# Patient Record
Sex: FEMALE | Race: WHITE | NOT HISPANIC OR LATINO | URBAN - METROPOLITAN AREA
[De-identification: names, ages, dates, MRNs, and addresses within clinical notes are randomized per-mention and may not be internally consistent; named-entity substitution may affect disease eponyms.]

---

## 2018-03-08 ENCOUNTER — OUTPATIENT (OUTPATIENT)
Dept: OUTPATIENT SERVICES | Facility: HOSPITAL | Age: 33
LOS: 1 days | End: 2018-03-08

## 2018-03-08 VITALS
DIASTOLIC BLOOD PRESSURE: 80 MMHG | HEART RATE: 70 BPM | TEMPERATURE: 98 F | WEIGHT: 130.95 LBS | HEIGHT: 64 IN | RESPIRATION RATE: 14 BRPM | SYSTOLIC BLOOD PRESSURE: 124 MMHG

## 2018-03-08 DIAGNOSIS — O02.1 MISSED ABORTION: ICD-10-CM

## 2018-03-08 DIAGNOSIS — Z90.89 ACQUIRED ABSENCE OF OTHER ORGANS: Chronic | ICD-10-CM

## 2018-03-08 DIAGNOSIS — Z98.890 OTHER SPECIFIED POSTPROCEDURAL STATES: Chronic | ICD-10-CM

## 2018-03-08 LAB
BLD GP AB SCN SERPL QL: NEGATIVE — SIGNIFICANT CHANGE UP
HCT VFR BLD CALC: 38.1 % — SIGNIFICANT CHANGE UP (ref 34.5–45)
HGB BLD-MCNC: 13.2 G/DL — SIGNIFICANT CHANGE UP (ref 11.5–15.5)
MCHC RBC-ENTMCNC: 32 PG — SIGNIFICANT CHANGE UP (ref 27–34)
MCHC RBC-ENTMCNC: 34.6 % — SIGNIFICANT CHANGE UP (ref 32–36)
MCV RBC AUTO: 92.3 FL — SIGNIFICANT CHANGE UP (ref 80–100)
NRBC # FLD: 0 — SIGNIFICANT CHANGE UP
PLATELET # BLD AUTO: 190 K/UL — SIGNIFICANT CHANGE UP (ref 150–400)
PMV BLD: 9.7 FL — SIGNIFICANT CHANGE UP (ref 7–13)
RBC # BLD: 4.13 M/UL — SIGNIFICANT CHANGE UP (ref 3.8–5.2)
RBC # FLD: 11.2 % — SIGNIFICANT CHANGE UP (ref 10.3–14.5)
RH IG SCN BLD-IMP: POSITIVE — SIGNIFICANT CHANGE UP
WBC # BLD: 5.52 K/UL — SIGNIFICANT CHANGE UP (ref 3.8–10.5)
WBC # FLD AUTO: 5.52 K/UL — SIGNIFICANT CHANGE UP (ref 3.8–10.5)

## 2018-03-08 NOTE — H&P PST ADULT - NSANTHOSAYNRD_GEN_A_CORE
No. CHRISTI screening performed.  STOP BANG Legend: 0-2 = LOW Risk; 3-4 = INTERMEDIATE Risk; 5-8 = HIGH Risk

## 2018-03-08 NOTE — H&P PST ADULT - RS GEN PE MLT RESP DETAILS PC
no wheezes/no rales/good air movement/clear to auscultation bilaterally/breath sounds equal/no rhonchi/respirations non-labored

## 2018-03-08 NOTE — H&P PST ADULT - HISTORY OF PRESENT ILLNESS
This is a 32 y.o. female LMP 17  , who presented to Dr Ramires at 6.5 weeks gestation , bloods done at that timeto confirm pregnancy . Pt complaint of vaginal spotting , evaluated 3/7/18 , had sono , no heartbeat. Pt has missed  , now for surgery .

## 2018-03-08 NOTE — H&P PST ADULT - LYMPHATIC
anterior cervical L/posterior cervical L/supraclavicular R/anterior cervical R/posterior cervical R/supraclavicular L

## 2018-03-08 NOTE — H&P PST ADULT - FAMILY HISTORY
Father  Still living? Yes, Estimated age: 61-70  Family history of hypertension, Age at diagnosis: Age Unknown     Mother  Still living? Yes, Estimated age: 61-70  Family history of diabetes mellitus, Age at diagnosis: Age Unknown

## 2018-03-08 NOTE — H&P PST ADULT - MUSCULOSKELETAL
detailed exam no joint swelling/normal strength/no joint erythema/no calf tenderness/no joint warmth/ROM intact details…

## 2018-03-13 ENCOUNTER — TRANSCRIPTION ENCOUNTER (OUTPATIENT)
Age: 33
End: 2018-03-13

## 2018-04-06 ENCOUNTER — TRANSCRIPTION ENCOUNTER (OUTPATIENT)
Age: 33
End: 2018-04-06

## 2018-07-02 ENCOUNTER — TRANSCRIPTION ENCOUNTER (OUTPATIENT)
Age: 33
End: 2018-07-02

## 2018-11-03 ENCOUNTER — TRANSCRIPTION ENCOUNTER (OUTPATIENT)
Age: 33
End: 2018-11-03

## 2019-01-06 ENCOUNTER — TRANSCRIPTION ENCOUNTER (OUTPATIENT)
Age: 34
End: 2019-01-06

## 2019-02-14 ENCOUNTER — INPATIENT (INPATIENT)
Facility: HOSPITAL | Age: 34
LOS: 1 days | Discharge: HOME | End: 2019-02-16
Attending: SPECIALIST | Admitting: SPECIALIST

## 2019-02-14 ENCOUNTER — TRANSCRIPTION ENCOUNTER (OUTPATIENT)
Age: 34
End: 2019-02-14

## 2019-02-14 VITALS — WEIGHT: 158.73 LBS | HEIGHT: 65 IN

## 2019-02-14 DIAGNOSIS — Z3A.00 WEEKS OF GESTATION OF PREGNANCY NOT SPECIFIED: ICD-10-CM

## 2019-02-14 DIAGNOSIS — Z90.89 ACQUIRED ABSENCE OF OTHER ORGANS: Chronic | ICD-10-CM

## 2019-02-14 DIAGNOSIS — O26.899 OTHER SPECIFIED PREGNANCY RELATED CONDITIONS, UNSPECIFIED TRIMESTER: ICD-10-CM

## 2019-02-14 DIAGNOSIS — Z98.890 OTHER SPECIFIED POSTPROCEDURAL STATES: Chronic | ICD-10-CM

## 2019-02-14 LAB
BASOPHILS # BLD AUTO: 0.02 K/UL — SIGNIFICANT CHANGE UP (ref 0–0.2)
BASOPHILS NFR BLD AUTO: 0.2 % — SIGNIFICANT CHANGE UP (ref 0–2)
BLD GP AB SCN SERPL QL: NEGATIVE — SIGNIFICANT CHANGE UP
EOSINOPHIL # BLD AUTO: 0.02 K/UL — SIGNIFICANT CHANGE UP (ref 0–0.5)
EOSINOPHIL NFR BLD AUTO: 0.2 % — SIGNIFICANT CHANGE UP (ref 0–6)
HCT VFR BLD CALC: 39 % — SIGNIFICANT CHANGE UP (ref 34.5–45)
HGB BLD-MCNC: 12.7 G/DL — SIGNIFICANT CHANGE UP (ref 11.5–15.5)
IMM GRANULOCYTES NFR BLD AUTO: 0.4 % — SIGNIFICANT CHANGE UP (ref 0–1.5)
LYMPHOCYTES # BLD AUTO: 1.38 K/UL — SIGNIFICANT CHANGE UP (ref 1–3.3)
LYMPHOCYTES # BLD AUTO: 14.8 % — SIGNIFICANT CHANGE UP (ref 13–44)
MCHC RBC-ENTMCNC: 30.7 PG — SIGNIFICANT CHANGE UP (ref 27–34)
MCHC RBC-ENTMCNC: 32.6 % — SIGNIFICANT CHANGE UP (ref 32–36)
MCV RBC AUTO: 94.2 FL — SIGNIFICANT CHANGE UP (ref 80–100)
MONOCYTES # BLD AUTO: 0.62 K/UL — SIGNIFICANT CHANGE UP (ref 0–0.9)
MONOCYTES NFR BLD AUTO: 6.6 % — SIGNIFICANT CHANGE UP (ref 2–14)
NEUTROPHILS # BLD AUTO: 7.25 K/UL — SIGNIFICANT CHANGE UP (ref 1.8–7.4)
NEUTROPHILS NFR BLD AUTO: 77.8 % — HIGH (ref 43–77)
NRBC # FLD: 0 K/UL — LOW (ref 25–125)
PLATELET # BLD AUTO: 169 K/UL — SIGNIFICANT CHANGE UP (ref 150–400)
PMV BLD: 10.8 FL — SIGNIFICANT CHANGE UP (ref 7–13)
RBC # BLD: 4.14 M/UL — SIGNIFICANT CHANGE UP (ref 3.8–5.2)
RBC # FLD: 12.5 % — SIGNIFICANT CHANGE UP (ref 10.3–14.5)
RH IG SCN BLD-IMP: POSITIVE — SIGNIFICANT CHANGE UP
T PALLIDUM AB TITR SER: NEGATIVE — SIGNIFICANT CHANGE UP
WBC # BLD: 9.33 K/UL — SIGNIFICANT CHANGE UP (ref 3.8–10.5)
WBC # FLD AUTO: 9.33 K/UL — SIGNIFICANT CHANGE UP (ref 3.8–10.5)

## 2019-02-14 RX ORDER — ACETAMINOPHEN 500 MG
975 TABLET ORAL EVERY 6 HOURS
Qty: 0 | Refills: 0 | Status: COMPLETED | OUTPATIENT
Start: 2019-02-14 | End: 2020-01-13

## 2019-02-14 RX ORDER — OXYCODONE HYDROCHLORIDE 5 MG/1
5 TABLET ORAL
Qty: 0 | Refills: 0 | Status: DISCONTINUED | OUTPATIENT
Start: 2019-02-14 | End: 2019-02-16

## 2019-02-14 RX ORDER — TETANUS TOXOID, REDUCED DIPHTHERIA TOXOID AND ACELLULAR PERTUSSIS VACCINE, ADSORBED 5; 2.5; 8; 8; 2.5 [IU]/.5ML; [IU]/.5ML; UG/.5ML; UG/.5ML; UG/.5ML
0.5 SUSPENSION INTRAMUSCULAR ONCE
Qty: 0 | Refills: 0 | Status: DISCONTINUED | OUTPATIENT
Start: 2019-02-14 | End: 2019-02-16

## 2019-02-14 RX ORDER — AMPICILLIN TRIHYDRATE 250 MG
CAPSULE ORAL
Qty: 0 | Refills: 0 | Status: DISCONTINUED | OUTPATIENT
Start: 2019-02-14 | End: 2019-02-14

## 2019-02-14 RX ORDER — DOCUSATE SODIUM 100 MG
100 CAPSULE ORAL
Qty: 0 | Refills: 0 | Status: DISCONTINUED | OUTPATIENT
Start: 2019-02-14 | End: 2019-02-16

## 2019-02-14 RX ORDER — IBUPROFEN 200 MG
600 TABLET ORAL EVERY 6 HOURS
Qty: 0 | Refills: 0 | Status: COMPLETED | OUTPATIENT
Start: 2019-02-14 | End: 2020-01-13

## 2019-02-14 RX ORDER — HYDROCORTISONE 1 %
1 OINTMENT (GRAM) TOPICAL EVERY 4 HOURS
Qty: 0 | Refills: 0 | Status: DISCONTINUED | OUTPATIENT
Start: 2019-02-14 | End: 2019-02-14

## 2019-02-14 RX ORDER — AMPICILLIN TRIHYDRATE 250 MG
1 CAPSULE ORAL EVERY 4 HOURS
Qty: 0 | Refills: 0 | Status: DISCONTINUED | OUTPATIENT
Start: 2019-02-14 | End: 2019-02-14

## 2019-02-14 RX ORDER — OXYTOCIN 10 UNIT/ML
333.3 VIAL (ML) INJECTION
Qty: 20 | Refills: 0 | Status: COMPLETED | OUTPATIENT
Start: 2019-02-14 | End: 2019-02-14

## 2019-02-14 RX ORDER — SODIUM CHLORIDE 9 MG/ML
3 INJECTION INTRAMUSCULAR; INTRAVENOUS; SUBCUTANEOUS EVERY 8 HOURS
Qty: 0 | Refills: 0 | Status: DISCONTINUED | OUTPATIENT
Start: 2019-02-14 | End: 2019-02-16

## 2019-02-14 RX ORDER — OXYTOCIN 10 UNIT/ML
2 VIAL (ML) INJECTION
Qty: 30 | Refills: 0 | Status: DISCONTINUED | OUTPATIENT
Start: 2019-02-14 | End: 2019-02-14

## 2019-02-14 RX ORDER — HYDROCORTISONE 1 %
1 OINTMENT (GRAM) TOPICAL EVERY 4 HOURS
Qty: 0 | Refills: 0 | Status: DISCONTINUED | OUTPATIENT
Start: 2019-02-14 | End: 2019-02-15

## 2019-02-14 RX ORDER — OXYTOCIN 10 UNIT/ML
41.67 VIAL (ML) INJECTION
Qty: 20 | Refills: 0 | Status: DISCONTINUED | OUTPATIENT
Start: 2019-02-14 | End: 2019-02-15

## 2019-02-14 RX ORDER — DIPHENHYDRAMINE HCL 50 MG
25 CAPSULE ORAL EVERY 6 HOURS
Qty: 0 | Refills: 0 | Status: DISCONTINUED | OUTPATIENT
Start: 2019-02-14 | End: 2019-02-16

## 2019-02-14 RX ORDER — INFLUENZA VIRUS VACCINE 15; 15; 15; 15 UG/.5ML; UG/.5ML; UG/.5ML; UG/.5ML
0.5 SUSPENSION INTRAMUSCULAR ONCE
Qty: 0 | Refills: 0 | Status: DISCONTINUED | OUTPATIENT
Start: 2019-02-14 | End: 2019-02-16

## 2019-02-14 RX ORDER — SODIUM CHLORIDE 9 MG/ML
1000 INJECTION, SOLUTION INTRAVENOUS ONCE
Qty: 0 | Refills: 0 | Status: DISCONTINUED | OUTPATIENT
Start: 2019-02-14 | End: 2019-02-14

## 2019-02-14 RX ORDER — SIMETHICONE 80 MG/1
80 TABLET, CHEWABLE ORAL EVERY 6 HOURS
Qty: 0 | Refills: 0 | Status: DISCONTINUED | OUTPATIENT
Start: 2019-02-14 | End: 2019-02-16

## 2019-02-14 RX ORDER — DIBUCAINE 1 %
1 OINTMENT (GRAM) RECTAL EVERY 4 HOURS
Qty: 0 | Refills: 0 | Status: DISCONTINUED | OUTPATIENT
Start: 2019-02-14 | End: 2019-02-14

## 2019-02-14 RX ORDER — OXYCODONE HYDROCHLORIDE 5 MG/1
5 TABLET ORAL EVERY 4 HOURS
Qty: 0 | Refills: 0 | Status: DISCONTINUED | OUTPATIENT
Start: 2019-02-14 | End: 2019-02-16

## 2019-02-14 RX ORDER — PRAMOXINE HYDROCHLORIDE 150 MG/15G
1 AEROSOL, FOAM RECTAL EVERY 4 HOURS
Qty: 0 | Refills: 0 | Status: DISCONTINUED | OUTPATIENT
Start: 2019-02-14 | End: 2019-02-14

## 2019-02-14 RX ORDER — KETOROLAC TROMETHAMINE 30 MG/ML
30 SYRINGE (ML) INJECTION ONCE
Qty: 0 | Refills: 0 | Status: DISCONTINUED | OUTPATIENT
Start: 2019-02-14 | End: 2019-02-15

## 2019-02-14 RX ORDER — GLYCERIN ADULT
1 SUPPOSITORY, RECTAL RECTAL AT BEDTIME
Qty: 0 | Refills: 0 | Status: DISCONTINUED | OUTPATIENT
Start: 2019-02-14 | End: 2019-02-16

## 2019-02-14 RX ORDER — OXYTOCIN 10 UNIT/ML
333.3 VIAL (ML) INJECTION
Qty: 20 | Refills: 0 | Status: COMPLETED | OUTPATIENT
Start: 2019-02-14

## 2019-02-14 RX ORDER — PRAMOXINE HYDROCHLORIDE 150 MG/15G
1 AEROSOL, FOAM RECTAL EVERY 4 HOURS
Qty: 0 | Refills: 0 | Status: DISCONTINUED | OUTPATIENT
Start: 2019-02-14 | End: 2019-02-15

## 2019-02-14 RX ORDER — SODIUM CHLORIDE 9 MG/ML
1000 INJECTION, SOLUTION INTRAVENOUS
Qty: 0 | Refills: 0 | Status: DISCONTINUED | OUTPATIENT
Start: 2019-02-14 | End: 2019-02-14

## 2019-02-14 RX ORDER — OXYTOCIN 10 UNIT/ML
41.67 VIAL (ML) INJECTION
Qty: 20 | Refills: 0 | Status: DISCONTINUED | OUTPATIENT
Start: 2019-02-14 | End: 2019-02-14

## 2019-02-14 RX ORDER — DIBUCAINE 1 %
1 OINTMENT (GRAM) RECTAL EVERY 4 HOURS
Qty: 0 | Refills: 0 | Status: DISCONTINUED | OUTPATIENT
Start: 2019-02-14 | End: 2019-02-16

## 2019-02-14 RX ORDER — LANOLIN
1 OINTMENT (GRAM) TOPICAL EVERY 6 HOURS
Qty: 0 | Refills: 0 | Status: DISCONTINUED | OUTPATIENT
Start: 2019-02-14 | End: 2019-02-16

## 2019-02-14 RX ORDER — CITRIC ACID/SODIUM CITRATE 300-500 MG
15 SOLUTION, ORAL ORAL EVERY 4 HOURS
Qty: 0 | Refills: 0 | Status: DISCONTINUED | OUTPATIENT
Start: 2019-02-14 | End: 2019-02-14

## 2019-02-14 RX ORDER — AMPICILLIN TRIHYDRATE 250 MG
2 CAPSULE ORAL ONCE
Qty: 0 | Refills: 0 | Status: COMPLETED | OUTPATIENT
Start: 2019-02-14 | End: 2019-02-14

## 2019-02-14 RX ORDER — IBUPROFEN 200 MG
600 TABLET ORAL EVERY 6 HOURS
Qty: 0 | Refills: 0 | Status: DISCONTINUED | OUTPATIENT
Start: 2019-02-14 | End: 2019-02-16

## 2019-02-14 RX ORDER — MAGNESIUM HYDROXIDE 400 MG/1
30 TABLET, CHEWABLE ORAL
Qty: 0 | Refills: 0 | Status: DISCONTINUED | OUTPATIENT
Start: 2019-02-14 | End: 2019-02-16

## 2019-02-14 RX ORDER — ACETAMINOPHEN 500 MG
975 TABLET ORAL EVERY 6 HOURS
Qty: 0 | Refills: 0 | Status: DISCONTINUED | OUTPATIENT
Start: 2019-02-14 | End: 2019-02-16

## 2019-02-14 RX ORDER — AER TRAVELER 0.5 G/1
1 SOLUTION RECTAL; TOPICAL EVERY 4 HOURS
Qty: 0 | Refills: 0 | Status: DISCONTINUED | OUTPATIENT
Start: 2019-02-14 | End: 2019-02-14

## 2019-02-14 RX ORDER — SODIUM CHLORIDE 9 MG/ML
3 INJECTION INTRAMUSCULAR; INTRAVENOUS; SUBCUTANEOUS EVERY 8 HOURS
Qty: 0 | Refills: 0 | Status: DISCONTINUED | OUTPATIENT
Start: 2019-02-14 | End: 2019-02-14

## 2019-02-14 RX ORDER — AER TRAVELER 0.5 G/1
1 SOLUTION RECTAL; TOPICAL EVERY 4 HOURS
Qty: 0 | Refills: 0 | Status: DISCONTINUED | OUTPATIENT
Start: 2019-02-14 | End: 2019-02-16

## 2019-02-14 RX ADMIN — Medication 999.9 MILLIUNIT(S)/MIN: at 14:40

## 2019-02-14 RX ADMIN — Medication 125 MILLIUNIT(S)/MIN: at 15:41

## 2019-02-14 RX ADMIN — Medication 975 MILLIGRAM(S): at 18:50

## 2019-02-14 RX ADMIN — Medication 600 MILLIGRAM(S): at 18:50

## 2019-02-14 RX ADMIN — Medication 216 GRAM(S): at 11:15

## 2019-02-14 NOTE — DISCHARGE NOTE OB - PATIENT PORTAL LINK FT
You can access the [facility] Patient Portal, offered by [facility], by registering with the following website: [email or web address]

## 2019-02-14 NOTE — DISCHARGE NOTE OB - MATERIALS PROVIDED
Guide to Postpartum Care/[facility] Hearing Screen Program/Birth Certificate Instructions/  Immunization Record/Shaken Baby Prevention Handout/Vaccinations

## 2019-02-14 NOTE — DISCHARGE NOTE OB - CARE PROVIDER_API CALL
Lakhwinder Ramires)  Obstetrics and Gynecology  [address], [address] [place] [address]  Phone: [phone]  Fax: [phone]  Follow Up Time:

## 2019-02-15 RX ORDER — PRAMOXINE HYDROCHLORIDE 150 MG/15G
1 AEROSOL, FOAM RECTAL EVERY 4 HOURS
Qty: 0 | Refills: 0 | Status: DISCONTINUED | OUTPATIENT
Start: 2019-02-15 | End: 2019-02-16

## 2019-02-15 RX ORDER — HYDROCORTISONE 1 %
1 OINTMENT (GRAM) TOPICAL EVERY 4 HOURS
Qty: 0 | Refills: 0 | Status: DISCONTINUED | OUTPATIENT
Start: 2019-02-15 | End: 2019-02-16

## 2019-02-15 RX ADMIN — Medication 600 MILLIGRAM(S): at 15:00

## 2019-02-15 RX ADMIN — Medication 975 MILLIGRAM(S): at 08:00

## 2019-02-15 RX ADMIN — Medication 975 MILLIGRAM(S): at 15:00

## 2019-02-15 RX ADMIN — Medication 600 MILLIGRAM(S): at 04:00

## 2019-02-15 RX ADMIN — Medication 975 MILLIGRAM(S): at 23:20

## 2019-02-15 RX ADMIN — Medication 600 MILLIGRAM(S): at 03:04

## 2019-02-15 RX ADMIN — Medication 100 MILLIGRAM(S): at 01:15

## 2019-02-15 RX ADMIN — Medication 600 MILLIGRAM(S): at 14:02

## 2019-02-15 RX ADMIN — Medication 975 MILLIGRAM(S): at 14:01

## 2019-02-15 RX ADMIN — Medication 600 MILLIGRAM(S): at 22:51

## 2019-02-15 RX ADMIN — Medication 975 MILLIGRAM(S): at 22:51

## 2019-02-15 RX ADMIN — Medication 975 MILLIGRAM(S): at 02:11

## 2019-02-15 RX ADMIN — Medication 600 MILLIGRAM(S): at 23:20

## 2019-02-15 RX ADMIN — Medication 975 MILLIGRAM(S): at 01:15

## 2019-02-15 RX ADMIN — Medication 1 TABLET(S): at 20:03

## 2019-02-15 RX ADMIN — Medication 100 MILLIGRAM(S): at 22:51

## 2019-02-15 RX ADMIN — Medication 975 MILLIGRAM(S): at 07:02

## 2019-02-15 NOTE — PROGRESS NOTE ADULT - SUBJECTIVE AND OBJECTIVE BOX
INTERVAL HPI/OVERNIGHT EVENTS:  33y Female s/p labor epidural on     Vital Signs Last 24 Hrs  T(C): 36.6 (15 Feb 2019 05:37), Max: 36.8 (14 Feb 2019 15:36)  T(F): 97.9 (15 Feb 2019 05:37), Max: 98.2 (14 Feb 2019 15:36)  HR: 86 (15 Feb 2019 05:37) (71 - 86)  BP: 129/71 (15 Feb 2019 05:37) (110/68 - 129/71)  BP(mean): 88 (14 Feb 2019 15:06) (85 - 88)  RR: 16 (15 Feb 2019 05:37) (16 - 17)  SpO2: 99% (15 Feb 2019 05:37) (98% - 100%)    Patient seen, doing well, no anesthetic complications or complaints noted or reported.

## 2019-02-15 NOTE — LACTATION INITIAL EVALUATION - INTERVENTION OUTCOME
Offered pt assistance with breastfeeding-experienced breastfeeding mother declines assistance at this time. Reviewed feeding on demand, recognizing feeding cues and utilizing feeding log. Safe skin to skin, safe sleep and rooming in promoted.

## 2019-02-15 NOTE — PROVIDER CONTACT NOTE (OTHER) - ASSESSMENT
Patient stable, ambulating in room. States to RN upon initial meeting patient stating there is a blood clot "from earlier, not sure what time". Fundus firm, -1 height and midline. No increased perineal bleeding upon abdominal palpation.

## 2019-02-15 NOTE — PROVIDER CONTACT NOTE (OTHER) - ACTION/TREATMENT ORDERED:
will continue to monitor. patient aware to alert RN via call bell in reach if additional perineal blood clots noted. Pt verbalized understanding.

## 2019-02-15 NOTE — PROVIDER CONTACT NOTE (OTHER) - SITUATION
golfball sized perineal blood clot noted on perineal pad. patient stating it was "from earlier, not sure what time"

## 2019-02-16 VITALS
HEART RATE: 80 BPM | DIASTOLIC BLOOD PRESSURE: 74 MMHG | RESPIRATION RATE: 17 BRPM | TEMPERATURE: 98 F | SYSTOLIC BLOOD PRESSURE: 120 MMHG | OXYGEN SATURATION: 99 %

## 2019-02-16 RX ORDER — IBUPROFEN 200 MG
1 TABLET ORAL
Qty: 0 | Refills: 0 | COMMUNITY

## 2019-02-16 RX ORDER — IBUPROFEN 200 MG
1 TABLET ORAL
Qty: 0 | Refills: 0 | COMMUNITY
Start: 2019-02-16

## 2019-02-16 RX ORDER — ACETAMINOPHEN 500 MG
3 TABLET ORAL
Qty: 0 | Refills: 0 | COMMUNITY
Start: 2019-02-16

## 2019-02-16 RX ADMIN — Medication 600 MILLIGRAM(S): at 06:53

## 2019-02-16 RX ADMIN — Medication 975 MILLIGRAM(S): at 07:23

## 2019-02-16 RX ADMIN — Medication 600 MILLIGRAM(S): at 07:23

## 2019-02-16 RX ADMIN — Medication 975 MILLIGRAM(S): at 06:53

## 2020-03-20 ENCOUNTER — TRANSCRIPTION ENCOUNTER (OUTPATIENT)
Age: 35
End: 2020-03-20

## 2020-06-30 ENCOUNTER — TRANSCRIPTION ENCOUNTER (OUTPATIENT)
Age: 35
End: 2020-06-30

## 2023-01-17 NOTE — DISCHARGE NOTE OB - FUNCTIONAL SCREEN CURRENT LEVEL: AMBULATION, MLM
0 = independent Guy Saucedo  Internal Medicine  [facility], Phys,    Phone: ([phone]  Fax: ([phone]  Follow Up Time:

## 2023-03-08 ENCOUNTER — NON-APPOINTMENT (OUTPATIENT)
Age: 38
End: 2023-03-08

## 2023-03-08 PROBLEM — Z00.00 ENCOUNTER FOR PREVENTIVE HEALTH EXAMINATION: Status: ACTIVE | Noted: 2023-03-08

## 2023-03-09 ENCOUNTER — TRANSCRIPTION ENCOUNTER (OUTPATIENT)
Age: 38
End: 2023-03-09

## 2023-03-09 ENCOUNTER — APPOINTMENT (OUTPATIENT)
Dept: GASTROENTEROLOGY | Facility: CLINIC | Age: 38
End: 2023-03-09
Payer: COMMERCIAL

## 2023-03-09 DIAGNOSIS — Z83.79 FAMILY HISTORY OF OTHER DISEASES OF THE DIGESTIVE SYSTEM: ICD-10-CM

## 2023-03-09 DIAGNOSIS — Z83.3 FAMILY HISTORY OF DIABETES MELLITUS: ICD-10-CM

## 2023-03-09 DIAGNOSIS — R19.4 CHANGE IN BOWEL HABIT: ICD-10-CM

## 2023-03-09 DIAGNOSIS — K59.00 CONSTIPATION, UNSPECIFIED: ICD-10-CM

## 2023-03-09 DIAGNOSIS — U07.1 COVID-19: ICD-10-CM

## 2023-03-09 DIAGNOSIS — Z78.9 OTHER SPECIFIED HEALTH STATUS: ICD-10-CM

## 2023-03-09 DIAGNOSIS — R14.0 ABDOMINAL DISTENSION (GASEOUS): ICD-10-CM

## 2023-03-09 PROCEDURE — 36415 COLL VENOUS BLD VENIPUNCTURE: CPT

## 2023-03-09 PROCEDURE — 99204 OFFICE O/P NEW MOD 45 MIN: CPT | Mod: 25

## 2023-03-09 RX ORDER — MULTIVIT-MIN/IRON/FOLIC ACID/K 18-600-40
CAPSULE ORAL
Refills: 0 | Status: ACTIVE | COMMUNITY

## 2023-03-13 NOTE — ASSESSMENT
[FreeTextEntry1] : Patient with almost daily heartburn which has been worsening.  She has a change in bowel habits with new constipation.  She also complains of bloating.  Of note, both of her parents have celiac disease.\par \par An EGD and colonoscopy have been scheduled. The risks, benefits, alternatives, and limitations of the procedures, including the possibility of missed lesions, were explained.\par \par Bloodwork was sent for CBC, chem-pack, TSH, celiac markers, and celiac HLA testing.\par \par A list of dietary and lifestyle modifications in the treatment of GERD was given to the patient.

## 2023-03-13 NOTE — CONSULT LETTER
[FreeTextEntry1] : Dear Dr. Ariana Thomas,\par \par I had the pleasure of seeing your patient JOSEPH GOING in the office today.  My office note is attached. PLEASE READ THE "ASSESSMENT" SECTION OF THE NOTE TO SEE MY IMPRESSION AND PLAN.\par \par Thank you very much for allowing me to participate in the care of your patient.\par \par Sincerely,\par \par Vikram Magaña M.D., FAC, FACP\par Director, Celiac Program at [facility]\par  of Medicine, [employer] at [facility] Adjunct  of Medicine, [facility] Practice Director, [facility] Gastroenterology at [place]\[facility] [address]\[place] [address]\par Tel: [phone]\par Email: [email or web address]\par \par \par The attached note has been created using a voice recognition system (Dragon).  There may be some misspellings and typos.  Please call my office if you have any issues or questions.

## 2023-03-13 NOTE — HISTORY OF PRESENT ILLNESS
[FreeTextEntry1] : The patient is a 37-year-old woman whose mother and father both have celiac disease.  His mother and father both have celiac disease.  She notes frequent heartburn occurring almost daily.  The heartburn has increased over the past 6 months.  She has been taking over-the-counter Pepcid virtually daily with some relief.  She has also been taking Tums.  She denies dysphagia, nausea, vomiting, abdominal pain.  She does experience bloating.  She also notes a change in her bowel habits over the past 1 to 2 years with new constipation which is progressively been worsening.  She states that she can go up to 1 week without a bowel movement.  She has been taking Colace periodically and is tried Citrucel.  She denies melena or bright red blood per rectum.  The patient's weight is stable.  The patient has not been admitted to the hospital in the past year and denies any cardiac issues.

## 2023-03-13 NOTE — REASON FOR VISIT
[Initial Evaluation] : an initial evaluation [FreeTextEntry1] : Heartburn, bloating, change in bowel habits, constipation, strong family history of celiac disease

## 2023-03-21 LAB
ALBUMIN SERPL ELPH-MCNC: 4.9 G/DL
ALP BLD-CCNC: 35 U/L
ALT SERPL-CCNC: 14 U/L
ANION GAP SERPL CALC-SCNC: 13 MMOL/L
AST SERPL-CCNC: 16 U/L
BASOPHILS # BLD AUTO: 0.02 K/UL
BASOPHILS NFR BLD AUTO: 0.5 %
BILIRUB SERPL-MCNC: 0.3 MG/DL
BUN SERPL-MCNC: 9 MG/DL
CALCIUM SERPL-MCNC: 9.6 MG/DL
CHLORIDE SERPL-SCNC: 103 MMOL/L
CO2 SERPL-SCNC: 24 MMOL/L
CREAT SERPL-MCNC: 0.56 MG/DL
EGFR: 120 ML/MIN/1.73M2
ENDOMYSIUM IGA SER QL: NEGATIVE
ENDOMYSIUM IGA TITR SER: NORMAL
EOSINOPHIL # BLD AUTO: 0.07 K/UL
EOSINOPHIL NFR BLD AUTO: 1.8 %
GGT SERPL-CCNC: 8 U/L
GLIADIN IGA SER QL: <5 UNITS
GLIADIN IGG SER QL: <5 UNITS
GLIADIN PEPTIDE IGA SER-ACNC: NEGATIVE
GLIADIN PEPTIDE IGG SER-ACNC: NEGATIVE
GLUCOSE SERPL-MCNC: 92 MG/DL
HCT VFR BLD CALC: 36.1 %
HGB BLD-MCNC: 11.3 G/DL
IGA SER QL IEP: 164 MG/DL
IMM GRANULOCYTES NFR BLD AUTO: 0.3 %
LYMPHOCYTES # BLD AUTO: 1.29 K/UL
LYMPHOCYTES NFR BLD AUTO: 32.9 %
MAN DIFF?: NORMAL
MCHC RBC-ENTMCNC: 27.3 PG
MCHC RBC-ENTMCNC: 31.3 GM/DL
MCV RBC AUTO: 87.2 FL
MONOCYTES # BLD AUTO: 0.26 K/UL
MONOCYTES NFR BLD AUTO: 6.6 %
NEUTROPHILS # BLD AUTO: 2.27 K/UL
NEUTROPHILS NFR BLD AUTO: 57.9 %
PLATELET # BLD AUTO: 231 K/UL
POTASSIUM SERPL-SCNC: 3.8 MMOL/L
PROMETHEUS CELIAC GENETICS: NORMAL
PROT SERPL-MCNC: 6.9 G/DL
RBC # BLD: 4.14 M/UL
RBC # FLD: 13.8 %
SODIUM SERPL-SCNC: 140 MMOL/L
TSH SERPL-ACNC: 1.8 UIU/ML
TTG IGA SER IA-ACNC: <1.2 U/ML
TTG IGA SER-ACNC: NEGATIVE
TTG IGG SER IA-ACNC: <1.2 U/ML
TTG IGG SER IA-ACNC: NEGATIVE
WBC # FLD AUTO: 3.92 K/UL

## 2023-03-22 ENCOUNTER — NON-APPOINTMENT (OUTPATIENT)
Age: 38
End: 2023-03-22

## 2023-04-04 NOTE — H&P PST ADULT - PAIN SCALE PREFERRED, PROFILE
Air and stool throughout the colon and rectum.  Scattered diverticula within the colon.  No mesenteric inflammatory change.  No CT evidence for bowel obstruction.  The appendix is normal in caliber.  There is a poorly defined soft tissue attenuation mass of the distal gastric body near the gastric antrum measuring 2.5 x 3.2 cm.  This is suspicious for a gastric neoplasm.    General surgery consulted. Planning for endoscopy tomorrow. NPO at [place]     numerical 0-10

## 2023-06-27 ENCOUNTER — APPOINTMENT (OUTPATIENT)
Dept: GASTROENTEROLOGY | Facility: AMBULATORY MEDICAL SERVICES | Age: 38
End: 2023-06-27
Payer: COMMERCIAL

## 2023-06-27 PROCEDURE — 43239 EGD BIOPSY SINGLE/MULTIPLE: CPT | Mod: 59

## 2023-06-27 PROCEDURE — 45378 DIAGNOSTIC COLONOSCOPY: CPT

## 2023-07-11 ENCOUNTER — APPOINTMENT (OUTPATIENT)
Dept: GASTROENTEROLOGY | Facility: CLINIC | Age: 38
End: 2023-07-11
Payer: COMMERCIAL

## 2023-07-11 DIAGNOSIS — D64.9 ANEMIA, UNSPECIFIED: ICD-10-CM

## 2023-07-11 DIAGNOSIS — K31.7 POLYP OF STOMACH AND DUODENUM: ICD-10-CM

## 2023-07-11 DIAGNOSIS — R12 HEARTBURN: ICD-10-CM

## 2023-07-11 DIAGNOSIS — K64.4 RESIDUAL HEMORRHOIDAL SKIN TAGS: ICD-10-CM

## 2023-07-11 PROCEDURE — 99214 OFFICE O/P EST MOD 30 MIN: CPT | Mod: 95

## 2023-07-11 RX ORDER — SODIUM PICOSULFATE, MAGNESIUM OXIDE, AND ANHYDROUS CITRIC ACID 10; 3.5; 12 MG/160ML; G/160ML; G/160ML
10-3.5-12 MG-GM LIQUID ORAL
Qty: 1 | Refills: 0 | Status: DISCONTINUED | COMMUNITY
Start: 2023-03-09 | End: 2023-07-11

## 2023-07-12 ENCOUNTER — NON-APPOINTMENT (OUTPATIENT)
Age: 38
End: 2023-07-12

## 2023-07-12 PROBLEM — D64.9 ANEMIA: Status: ACTIVE | Noted: 2023-07-12

## 2023-07-12 PROBLEM — R12 HEARTBURN: Status: ACTIVE | Noted: 2023-03-09

## 2023-07-12 NOTE — ASSESSMENT
[FreeTextEntry1] : Patient found to have excessive eosinophils at the GE junction consistent with reflux esophagitis.  She has frequent heartburn and indigestion.  She has a borderline anemia.\par \par I have started the patient on pantoprazole 40 mg a day.\par \par We discussed dietary and lifestyle modifications in the treatment of reflux.  The patient is already on a healthy diet and does not have any glaring behavioral problems to correct.\par \par Patient will go for blood work to include CBC, iron studies, B12, folate.\par \par We will repeat colonoscopy for screening purposes at age 45.\par \par We will follow-up with a televisit in 3 months.\par \par \par Plan from 3/9/2023 - Patient with almost daily heartburn which has been worsening.  She has a change in bowel habits with new constipation.  She also complains of bloating.  Of note, both of her parents have celiac disease.\par \par An EGD and colonoscopy have been scheduled. The risks, benefits, alternatives, and limitations of the procedures, including the possibility of missed lesions, were explained.\par \par Bloodwork was sent for CBC, chem-pack, TSH, celiac markers, and celiac HLA testing.\par \par A list of dietary and lifestyle modifications in the treatment of GERD was given to the patient.

## 2023-07-12 NOTE — HISTORY OF PRESENT ILLNESS
[Home] : at home, [unfilled] , at the time of the visit. [Medical Office: ([facility])___] : at the medical office located in  [Verbal consent obtained from patient] : the patient, [unfilled] [FreeTextEntry1] : This was a televideo visit.  We reviewed the evaluations done since the patient's last visit on March 9, 2023.  Blood work was significant for a borderline anemia with a hemoglobin and hematocrit of 11.3 and 36.1 respectively.  Of note, the patient's celiac markers were negative and her genetic testing was negative with only 1 allele of DQ 2.5 present.  This conveys less than 0.1 times risk of developing celiac disease compared to the general population, making the likelihood that she ever develop celiac disease extremely low despite her family history.  We reviewed the EGD and colonoscopy performed on June 27, 2023.  EGD was significant for the presence of benign fundic gland polyps in the gastric fundus and body.  Biopsies from the GE junction showed 15 eosinophils per high-power field with no eosinophils present in the rest of the esophageal biopsies.  This is consistent with reflux esophagitis.  Colonoscopy was normal other than external hemorrhoids which are asymptomatic.  The patient is experiencing heartburn/indigestion 2-3 times a week mostly at dinner and sometimes after lunch.  Her bowel movements have just returned to normal after the colonoscopy.\par \par \par Note from 3/9/2023 - The patient is a 37-year-old woman whose mother and father both have celiac disease.  His mother and father both have celiac disease.  She notes frequent heartburn occurring almost daily.  The heartburn has increased over the past 6 months.  She has been taking over-the-counter Pepcid virtually daily with some relief.  She has also been taking Tums.  She denies dysphagia, nausea, vomiting, abdominal pain.  She does experience bloating.  She also notes a change in her bowel habits over the past 1 to 2 years with new constipation which is progressively been worsening.  She states that she can go up to 1 week without a bowel movement.  She has been taking Colace periodically and is tried Citrucel.  She denies melena or bright red blood per rectum.  The patient's weight is stable.  The patient has not been admitted to the hospital in the past year and denies any cardiac issues.

## 2023-07-12 NOTE — CONSULT LETTER
[FreeTextEntry1] : Dear Dr. Ariana Thomas,\par \par I had the pleasure of seeing your patient JOSEPH GOING in the office today.  My office note is attached. PLEASE READ THE "ASSESSMENT" SECTION OF THE NOTE TO SEE MY IMPRESSION AND PLAN.\par \par Thank you very much for allowing me to participate in the care of your patient.\par \par Sincerely,\par \par Vikram Magaña M.D., FAC, FACP\par Director, Celiac Program at [facility]\par  of Medicine, [employer] at [facility] Adjunct  of Medicine, [facility] Practice Director, [facility] - Gastroenterology at [place]\[facility] [address]\[place] [address]\par Tel: [phone]\par Email: [email or web address]\par \par \par The attached note has been created using a voice recognition system (Dragon).  There may be some misspellings and typos.  Please call my office if you have any issues or questions.

## 2023-07-19 LAB
FERRITIN SERPL-MCNC: 7 NG/ML
FOLATE SERPL-MCNC: 12.5 NG/ML
IRON SATN MFR SERPL: 8 %
IRON SERPL-MCNC: 33 UG/DL
TIBC SERPL-MCNC: 408 UG/DL
UIBC SERPL-MCNC: 374 UG/DL
VIT B12 SERPL-MCNC: 346 PG/ML

## 2023-07-19 RX ORDER — METOCLOPRAMIDE 10 MG/1
10 TABLET ORAL
Qty: 1 | Refills: 0 | Status: ACTIVE | COMMUNITY
Start: 2023-07-19 | End: 1900-01-01

## 2023-08-24 ENCOUNTER — APPOINTMENT (OUTPATIENT)
Dept: GASTROENTEROLOGY | Facility: CLINIC | Age: 38
End: 2023-08-24

## 2023-10-17 ENCOUNTER — APPOINTMENT (OUTPATIENT)
Dept: GASTROENTEROLOGY | Facility: CLINIC | Age: 38
End: 2023-10-17
Payer: COMMERCIAL

## 2023-10-17 DIAGNOSIS — D50.9 IRON DEFICIENCY ANEMIA, UNSPECIFIED: ICD-10-CM

## 2023-10-17 DIAGNOSIS — K21.00 GASTRO-ESOPHAGEAL REFLUX DISEASE WITH ESOPHAGITIS, WITHOUT BLEEDING: ICD-10-CM

## 2023-10-17 PROCEDURE — 99213 OFFICE O/P EST LOW 20 MIN: CPT | Mod: 95

## 2024-01-04 ENCOUNTER — APPOINTMENT (OUTPATIENT)
Dept: GASTROENTEROLOGY | Facility: CLINIC | Age: 39
End: 2024-01-04
Payer: COMMERCIAL

## 2024-01-04 ENCOUNTER — APPOINTMENT (OUTPATIENT)
Dept: GASTROENTEROLOGY | Facility: AMBULATORY MEDICAL SERVICES | Age: 39
End: 2024-01-04

## 2024-01-04 PROCEDURE — 91110 GI TRC IMG INTRAL ESOPH-ILE: CPT | Mod: 26

## 2024-01-04 PROCEDURE — 91110 GI TRC IMG INTRAL ESOPH-ILE: CPT | Mod: TC

## 2024-01-16 ENCOUNTER — NON-APPOINTMENT (OUTPATIENT)
Age: 39
End: 2024-01-16

## 2024-03-12 RX ORDER — PANTOPRAZOLE 40 MG/1
40 TABLET, DELAYED RELEASE ORAL
Qty: 90 | Refills: 1 | Status: ACTIVE | COMMUNITY
Start: 2023-07-11 | End: 1900-01-01

## 2024-06-27 ENCOUNTER — APPOINTMENT (OUTPATIENT)
Dept: GASTROENTEROLOGY | Facility: CLINIC | Age: 39
End: 2024-06-27

## 2025-02-18 ENCOUNTER — NON-APPOINTMENT (OUTPATIENT)
Age: 40
End: 2025-02-18

## 2025-04-15 ENCOUNTER — NON-APPOINTMENT (OUTPATIENT)
Age: 40
End: 2025-04-15